# Patient Record
Sex: FEMALE | Employment: UNEMPLOYED | URBAN - METROPOLITAN AREA
[De-identification: names, ages, dates, MRNs, and addresses within clinical notes are randomized per-mention and may not be internally consistent; named-entity substitution may affect disease eponyms.]

---

## 2023-10-03 ENCOUNTER — APPOINTMENT (OUTPATIENT)
Dept: PHYSICAL THERAPY | Facility: CLINIC | Age: 53
End: 2023-10-03
Payer: COMMERCIAL

## 2023-10-08 PROBLEM — S83.231A COMPLEX TEAR OF MEDIAL MENISCUS OF RIGHT KNEE: Status: ACTIVE | Noted: 2023-10-08

## 2023-10-08 PROBLEM — M25.561 RIGHT KNEE PAIN: Status: ACTIVE | Noted: 2023-10-08

## 2023-10-08 PROBLEM — R29.898 WEAKNESS OF EXTREMITY: Status: ACTIVE | Noted: 2023-10-08

## 2023-10-08 PROBLEM — D22.62 MELANOCYTIC NEVI OF LEFT UPPER LIMB, INCLUDING SHOULDER: Status: ACTIVE | Noted: 2022-04-01

## 2023-10-08 PROBLEM — M25.661 DECREASED RANGE OF MOTION OF RIGHT KNEE: Status: ACTIVE | Noted: 2023-10-08

## 2023-10-08 PROBLEM — D22.5 MELANOCYTIC NEVI OF TRUNK: Status: ACTIVE | Noted: 2022-04-01

## 2023-10-08 PROBLEM — L82.1 OTHER SEBORRHEIC KERATOSIS: Status: ACTIVE | Noted: 2022-04-01

## 2023-10-08 PROBLEM — D22.61 MELANOCYTIC NEVI OF RIGHT UPPER LIMB, INCLUDING SHOULDER: Status: ACTIVE | Noted: 2022-04-01

## 2023-10-08 PROBLEM — S83.511A: Status: ACTIVE | Noted: 2023-10-08

## 2023-10-08 PROBLEM — S83.511D DISRUPTION OF ANTERIOR CRUCIATE LIGAMENT OF KNEE, RIGHT, SUBSEQUENT ENCOUNTER: Status: ACTIVE | Noted: 2023-10-08

## 2023-10-08 RX ORDER — OXYCODONE AND ACETAMINOPHEN 5; 325 MG/1; MG/1
1 TABLET ORAL EVERY 4 HOURS PRN
COMMUNITY
Start: 2023-03-07

## 2023-10-08 RX ORDER — ASPIRIN 325 MG
325 TABLET ORAL DAILY
COMMUNITY
Start: 2023-03-07

## 2023-10-10 ENCOUNTER — TREATMENT (OUTPATIENT)
Dept: PHYSICAL THERAPY | Facility: CLINIC | Age: 53
End: 2023-10-10
Payer: COMMERCIAL

## 2023-10-10 DIAGNOSIS — R29.898 WEAKNESS OF EXTREMITY: Primary | ICD-10-CM

## 2023-10-10 PROCEDURE — 97110 THERAPEUTIC EXERCISES: CPT | Mod: GP | Performed by: PHYSICAL THERAPIST

## 2023-10-10 NOTE — PROGRESS NOTES
Physical Therapy    Physical Therapy Treatment    Patient Name: Carolin Obando  MRN: [id number]  Today's Date: 10/10/2023         Assessment:  Patient is progressing very well with ability to perform lateral hops without pain.  Patient presents with soft/equal landing during hops.  She had most difficulty with butt kicks today with mild feeling of instability.  Patient reported she will call to obtain her knee brace which may improve her confidence with returning to sport.   Patient is very compliant with her HEP.    All prior information for this patient's episode which began on 9/15/23 is documented in the [employer] system.       Plan:   Continue per POC with progression of exercises.      Current Problem  1. Weakness of extremity            Subjective   General:  Patient feels she is doing well no pain when pushing.       Precautions   Protocol in chart        Pain no pain       Objective       Treatments:  Therapeutic Exercise  Therapeutic Exercise Activity 1: Lateral sqaut jump x 10  Therapeutic Exercise Activity 2: Backward jog x 4  Therapeutic Exercise Activity 3: figure 8 hop x 5  Therapeutic Exercise Activity 4: high knees x 30 seconds x2  Therapeutic Exercise Activity 5: butt kicks x 30 seconds x 3  Therapeutic Exercise Activity 6: lateral shuffle x 4  Therapeutic Exercise Activity 7: wall sits x 30 seconds  Therapeutic Exercise Activity 8: 4 inch box jump x 5    OP EDUCATION:   Educated on exercise progression safety

## 2023-10-17 ENCOUNTER — APPOINTMENT (OUTPATIENT)
Dept: PHYSICAL THERAPY | Facility: CLINIC | Age: 53
End: 2023-10-17
Payer: COMMERCIAL

## 2023-10-18 DIAGNOSIS — R29.898 WEAKNESS OF EXTREMITY: Primary | ICD-10-CM

## 2023-10-24 ENCOUNTER — APPOINTMENT (OUTPATIENT)
Dept: PHYSICAL THERAPY | Facility: CLINIC | Age: 53
End: 2023-10-24
Payer: COMMERCIAL

## 2023-10-31 ENCOUNTER — APPOINTMENT (OUTPATIENT)
Dept: PHYSICAL THERAPY | Facility: CLINIC | Age: 53
End: 2023-10-31
Payer: COMMERCIAL

## 2023-11-10 ENCOUNTER — OFFICE VISIT (OUTPATIENT)
Dept: ORTHOPEDIC SURGERY | Facility: HOSPITAL | Age: 53
End: 2023-11-10
Payer: COMMERCIAL

## 2023-11-10 VITALS — WEIGHT: 128 LBS | HEIGHT: 69 IN | BODY MASS INDEX: 18.96 KG/M2

## 2023-11-10 DIAGNOSIS — S83.511D DISRUPTION OF ANTERIOR CRUCIATE LIGAMENT OF KNEE, RIGHT, SUBSEQUENT ENCOUNTER: Primary | ICD-10-CM

## 2023-11-10 PROCEDURE — 99213 OFFICE O/P EST LOW 20 MIN: CPT | Performed by: ORTHOPAEDIC SURGERY

## 2023-11-10 PROCEDURE — 1036F TOBACCO NON-USER: CPT | Performed by: ORTHOPAEDIC SURGERY

## 2023-11-10 NOTE — PROGRESS NOTES
Subjective    Patient ID: Carolin Obando is a 53 y.o. female.    Procedure: Right knee ACL reconstruction with allograft and partial lateral meniscectomy  Date of surgery: 3/11/2023      HPI:  Carolin Obando is a 53 y.o. female who is status post 8 months right knee ACL reconstruction with allograft and partial lateral meniscectomy.  Overall she is doing well.  She has returned back to physical therapy.  She has been compliant with her home exercise program.  She states she feels a little hesitant when she tries to do too much activity.  She will have a little bit of pain and discomfort as well.  She has had no recurrent injury or no reports of swelling.  She has not obtained her functional brace as of yet.    ROS  Constitutional: No fever, no chills, not feeling tired, no recent weight gain and no recent weight loss  ENT: No nosebleeds  Cardiovascular: No chest pain  Respiratory: No shortness of breath and no cough  Gastrointestinal: No abdominal pain, no nausea, no diarrhea, and no vomiting  Musculoskeletal: No arthralgias  Integumentary: No rashes and no skin lesions  Neurological: No headache  Psychiatric: No sleep disturbances no depression  Endocrine: No muscle weakness and no muscle cramps  Hematologic/lymphatic: No swelling glands and no tendency for easy bruising    No past medical history on file.     No past surgical history on file.       Current Outpatient Medications:     aspirin 325 mg tablet, Take 1 tablet (325 mg) by mouth once daily., Disp: , Rfl:     oxyCODONE-acetaminophen (Percocet) 5-325 mg tablet, Take 1 tablet by mouth every 4 hours if needed., Disp: , Rfl:      No Known Allergies     Social Connections: Not on file          Objective   53-year-old female well appearing in no acute distress. Alert and oriented ×3.  Skin intact bilateral lower extremities.   Normal tandem gait. Coordination and balance intact.  Bilateral lower extremity compartments supple.  5 out of 5 distal motor strength  bilaterally.  L4 through S1 sensation intact bilaterally.  2+ DP/PT pulses bilaterally.  Right knee incisions are well-healed with minimal scarring.  No joint effusion.  Improved quad tone.  Active range of motion 0 to 140 degrees, symmetric to the left knee.  Negative Lachman's.          Assessment/Plan   Encounter Diagnoses:  Right knee ACL disruption    Overall she is doing well.  We will check on the status of her functional brace.  We discussed continuing to improve her overall strength and endurance.  She can start performing some light functional activity in regards to pickleball and tennis.  At the 9-month point she can start to resume back to her usual level active activity, starting slow and gradually progressing.  The 1 exception is with skiing we would recommend waiting until she is 12 months post op.  We did discuss that there is a risk of recurrent ACL injury following a return to sports.  There is also the possibility of an injury to the contralateral knee.  She should perform her home exercise for the left knee as they will act as an ACL prevention program.  All of her questions were answered today.  We will plan on seeing her again in 6 weeks for a follow-up but if she is doing well she is welcome to cancel that appointment.    Dr. Richie Cote met with and examined to the patient today as well and formulated the treatment plan.    This office note was dictated using Dragon voice to text software and was not proofread for spelling or grammatical errors

## 2024-01-08 ENCOUNTER — APPOINTMENT (OUTPATIENT)
Dept: ORTHOPEDIC SURGERY | Facility: HOSPITAL | Age: 54
End: 2024-01-08
Payer: COMMERCIAL

## 2024-02-02 ENCOUNTER — DOCUMENTATION (OUTPATIENT)
Dept: PHYSICAL THERAPY | Facility: CLINIC | Age: 54
End: 2024-02-02
Payer: COMMERCIAL

## 2024-02-02 NOTE — PROGRESS NOTES
Physical Therapy    Discharge Summary    Name: Carolin Obando  MRN: [id number]  : 1970  Date: 24    Discharge Summary: PT    Discharge Information: Date of discharge 24, Date of last visit 10/10/23, Date of evaluation 9/15/23, Number of attended visits 4, Referred by Zain Gutierrez, and Referred for ACL    Therapy Summary: Patient was seen by PT s/p ACL reconstruction and was progressing very well with goals and beginning plyometrics      Discharge Status: unknown - patient discharged at this time secondary to several months since last seen by PT and patient would need new script to continue.     Rehab Discharge Reason: Achieved all and/or the most significant goals(s) - patient was progressing very well on last visit

## 2024-10-14 ENCOUNTER — APPOINTMENT (OUTPATIENT)
Dept: PRIMARY CARE | Facility: CLINIC | Age: 54
End: 2024-10-14
Payer: COMMERCIAL